# Patient Record
Sex: MALE | Race: WHITE | NOT HISPANIC OR LATINO | URBAN - METROPOLITAN AREA
[De-identification: names, ages, dates, MRNs, and addresses within clinical notes are randomized per-mention and may not be internally consistent; named-entity substitution may affect disease eponyms.]

---

## 2018-09-21 ENCOUNTER — EMERGENCY (EMERGENCY)
Facility: HOSPITAL | Age: 73
LOS: 1 days | Discharge: HOME | End: 2018-09-21
Attending: EMERGENCY MEDICINE | Admitting: EMERGENCY MEDICINE
Payer: MEDICARE

## 2018-09-21 VITALS
DIASTOLIC BLOOD PRESSURE: 71 MMHG | OXYGEN SATURATION: 96 % | TEMPERATURE: 97 F | HEART RATE: 69 BPM | SYSTOLIC BLOOD PRESSURE: 114 MMHG | RESPIRATION RATE: 18 BRPM

## 2018-09-21 DIAGNOSIS — Z86.79 PERSONAL HISTORY OF OTHER DISEASES OF THE CIRCULATORY SYSTEM: Chronic | ICD-10-CM

## 2018-09-21 DIAGNOSIS — F17.200 NICOTINE DEPENDENCE, UNSPECIFIED, UNCOMPLICATED: ICD-10-CM

## 2018-09-21 DIAGNOSIS — F10.129 ALCOHOL ABUSE WITH INTOXICATION, UNSPECIFIED: ICD-10-CM

## 2018-09-21 DIAGNOSIS — I10 ESSENTIAL (PRIMARY) HYPERTENSION: ICD-10-CM

## 2018-09-21 DIAGNOSIS — E11.9 TYPE 2 DIABETES MELLITUS WITHOUT COMPLICATIONS: ICD-10-CM

## 2018-09-21 PROCEDURE — 99283 EMERGENCY DEPT VISIT LOW MDM: CPT

## 2018-09-21 NOTE — ED PROVIDER NOTE - PHYSICAL EXAMINATION
pleasantly intoxicated, awake, alert, conversant, no distress.  No pain/tenderness in neck or back  steady gait/balance  normal neuro exam, normal strength/sensation/movement

## 2018-09-21 NOTE — ED PROVIDER NOTE - OBJECTIVE STATEMENT
Nontoxic well appearing smiling and laughing moderately intoxicated appearing elderly male presents by ambulance in company of wife after he had a witnessed fall from a standing position onto his bottom.  Patient is able to walk unassisted but given his weight he uses a walker for extra stability.  Patient was crossing the street and his walker wheel snagged on the curb and patient fell onto his bottom.  No head injury or LOC.  Patient wasn't able to get up without assistance so EMS was called.  Patient now has no complaints.  He is jovial and conversant.  No back pain, neck pain, abdominal pain, chest pain, weakness, numbness or other complaints.  Patient is able to stand and walk without assistance.   No evidence of head injury.  Patient has no complaints, wants to go.  Wife agrees.

## 2018-09-21 NOTE — ED ADULT NURSE NOTE - OBJECTIVE STATEMENT
pt. s/p mechanical fall, denies hitting his head or LOC. States he missed a step while walking after having a few drinks. SIgnificant other with patient denies any LOC or head trauma

## 2018-09-21 NOTE — ED ADULT TRIAGE NOTE - CHIEF COMPLAINT QUOTE
Pt. s/p mechanical fall, as per pt. was missed a step while stepping into the street. Pt. also admits to drinking some alcohol today. Denies hitting his head or LOC. Denies any pain.

## 2018-09-21 NOTE — ED PROVIDER NOTE - CARE PLAN
Principal Discharge DX:	Fall, initial encounter  Secondary Diagnosis:	Alcoholic intoxication without complication

## 2018-09-21 NOTE — ED ADULT NURSE NOTE - NSIMPLEMENTINTERV_GEN_ALL_ED
Implemented All Universal Safety Interventions:  [place] to call system. Call bell, personal items and telephone within reach. Instruct patient to call for assistance. Room bathroom lighting operational. Non-slip footwear when patient is off stretcher. Physically safe environment: no spills, clutter or unnecessary equipment. Stretcher in lowest position, wheels locked, appropriate side rails in place.

## 2018-09-21 NOTE — ED PROVIDER NOTE - MEDICAL DECISION MAKING DETAILS
Pleasantly intoxicated, mechanical fall from standing position onto buttocks.  No injuries or complaints.  No evidence of injury.  Does not take anticoagulants.  Wants to be discharged.  Will d/c to care of wife.